# Patient Record
Sex: FEMALE | Race: WHITE | URBAN - METROPOLITAN AREA
[De-identification: names, ages, dates, MRNs, and addresses within clinical notes are randomized per-mention and may not be internally consistent; named-entity substitution may affect disease eponyms.]

---

## 2023-05-05 ENCOUNTER — APPOINTMENT (OUTPATIENT)
Dept: URBAN - METROPOLITAN AREA CLINIC 235 | Age: 78
Setting detail: DERMATOLOGY
End: 2023-05-09

## 2023-05-05 DIAGNOSIS — L64.8 OTHER ANDROGENIC ALOPECIA: ICD-10-CM

## 2023-05-05 DIAGNOSIS — L70.8 OTHER ACNE: ICD-10-CM

## 2023-05-05 PROCEDURE — OTHER INVENTORY: OTHER

## 2023-05-05 PROCEDURE — OTHER MEDICATION COUNSELING: OTHER

## 2023-05-05 PROCEDURE — OTHER COUNSELING: OTHER

## 2023-05-05 PROCEDURE — OTHER ITCH-SCRATCH CYCLE COUNSELING: OTHER

## 2023-05-05 PROCEDURE — 99203 OFFICE O/P NEW LOW 30 MIN: CPT

## 2023-05-05 PROCEDURE — OTHER PRESCRIPTION: OTHER

## 2023-05-05 PROCEDURE — OTHER MIPS QUALITY: OTHER

## 2023-05-05 PROCEDURE — OTHER PRESCRIPTION MEDICATION MANAGEMENT: OTHER

## 2023-05-05 PROCEDURE — OTHER DIAGNOSIS COMMENT: OTHER

## 2023-05-05 RX ORDER — CLOBETASOL PROPIONATE 0.5 MG/ML
SOLUTION TOPICAL
Qty: 25 | Refills: 0 | Status: ERX | COMMUNITY
Start: 2023-05-05

## 2023-05-05 ASSESSMENT — LOCATION ZONE DERM: LOCATION ZONE: SCALP

## 2023-05-05 ASSESSMENT — LOCATION SIMPLE DESCRIPTION DERM
LOCATION SIMPLE: LEFT SCALP
LOCATION SIMPLE: SCALP

## 2023-05-05 ASSESSMENT — LOCATION DETAILED DESCRIPTION DERM
LOCATION DETAILED: LEFT MEDIAL FRONTAL SCALP
LOCATION DETAILED: RIGHT SUPERIOR PARIETAL SCALP

## 2023-05-05 NOTE — PROCEDURE: MEDICATION COUNSELING
H/o this and NSTEMI  Patient continues to have pain and palpitations  EKG abnormal  Will do stress myoview       Winlevi Counseling:  I discussed with the patient the risks of topical clascoterone including but not limited to erythema, scaling, itching, and stinging. Patient voiced their understanding.

## 2023-05-05 NOTE — HPI: HAIR LOSS
Previous Labs: No
How Did The Hair Loss Occur?: gradual in onset
How Severe Is Your Hair Loss?: mild
What Hair Products Do You Use?: Biotin thinkening shampoo
Additional History: 5 years ago hair perm and color every time under drier one spot feels burnt, stress doesn’t help

## 2023-05-05 NOTE — PROCEDURE: PRESCRIPTION MEDICATION MANAGEMENT
Detail Level: Zone
Plan: -Nutrafol daily x 6 months-1 year\\n-continue normal shampoo and conditioner \\n-lessen use of heat on hair
Render In Strict Bullet Format?: No
Initiate Treatment: -clobetasol 0.05 % scalp solution \\nSig: Apply a few drops to affected area on scalp twice daily for 2 weeks then prn flares